# Patient Record
Sex: MALE | Race: WHITE | NOT HISPANIC OR LATINO | URBAN - METROPOLITAN AREA
[De-identification: names, ages, dates, MRNs, and addresses within clinical notes are randomized per-mention and may not be internally consistent; named-entity substitution may affect disease eponyms.]

---

## 2019-02-20 ENCOUNTER — OUTPATIENT (OUTPATIENT)
Dept: OUTPATIENT SERVICES | Facility: HOSPITAL | Age: 64
LOS: 1 days | Discharge: HOME | End: 2019-02-20

## 2019-02-20 VITALS
WEIGHT: 175.05 LBS | HEIGHT: 70 IN | HEART RATE: 76 BPM | RESPIRATION RATE: 17 BRPM | OXYGEN SATURATION: 99 % | SYSTOLIC BLOOD PRESSURE: 140 MMHG | TEMPERATURE: 98 F | DIASTOLIC BLOOD PRESSURE: 87 MMHG

## 2019-02-20 VITALS — DIASTOLIC BLOOD PRESSURE: 78 MMHG | HEART RATE: 79 BPM | SYSTOLIC BLOOD PRESSURE: 136 MMHG | RESPIRATION RATE: 18 BRPM

## 2019-02-20 DIAGNOSIS — Z98.890 OTHER SPECIFIED POSTPROCEDURAL STATES: Chronic | ICD-10-CM

## 2019-02-20 RX ORDER — ONDANSETRON 8 MG/1
4 TABLET, FILM COATED ORAL ONCE
Qty: 0 | Refills: 0 | Status: DISCONTINUED | OUTPATIENT
Start: 2019-02-20 | End: 2019-03-07

## 2019-02-20 RX ORDER — SODIUM CHLORIDE 9 MG/ML
1000 INJECTION, SOLUTION INTRAVENOUS
Qty: 0 | Refills: 0 | Status: DISCONTINUED | OUTPATIENT
Start: 2019-02-20 | End: 2019-03-07

## 2019-02-20 RX ORDER — METOPROLOL TARTRATE 50 MG
1 TABLET ORAL
Qty: 0 | Refills: 0 | COMMUNITY

## 2019-02-20 RX ORDER — ATORVASTATIN CALCIUM 80 MG/1
1 TABLET, FILM COATED ORAL
Qty: 0 | Refills: 0 | COMMUNITY

## 2019-02-20 NOTE — PRE-ANESTHESIA EVALUATION ADULT - NSANTHOSAYNRD_GEN_A_CORE
No. RAMIN screening performed.  STOP BANG Legend: 0-2 = LOW Risk; 3-4 = INTERMEDIATE Risk; 5-8 = HIGH Risk

## 2019-02-28 DIAGNOSIS — H25.12 AGE-RELATED NUCLEAR CATARACT, LEFT EYE: ICD-10-CM

## 2019-02-28 DIAGNOSIS — H43.02 VITREOUS PROLAPSE, LEFT EYE: ICD-10-CM

## 2019-06-28 NOTE — ASU PREOP CHECKLIST - HEIGHT IN INCHES
----- Message from Davina Delgadillo DO sent at 6/28/2019  4:59 PM CDT -----  Please notify patient of normal/negative lab result for group A strep which causes strep throat. There is other strep bacteria present that usually resolves on its own without antibiotics. If not feeling better we can do a trial of antibiotics. Please confirm allergy list and ask patient which pharmacy they prefer.   10

## 2020-06-07 ENCOUNTER — EMERGENCY (EMERGENCY)
Facility: HOSPITAL | Age: 65
LOS: 0 days | Discharge: HOME | End: 2020-06-07
Attending: EMERGENCY MEDICINE | Admitting: EMERGENCY MEDICINE
Payer: COMMERCIAL

## 2020-06-07 VITALS
DIASTOLIC BLOOD PRESSURE: 93 MMHG | HEART RATE: 85 BPM | OXYGEN SATURATION: 98 % | SYSTOLIC BLOOD PRESSURE: 166 MMHG | HEIGHT: 70 IN | RESPIRATION RATE: 17 BRPM | WEIGHT: 169.09 LBS | TEMPERATURE: 98 F

## 2020-06-07 DIAGNOSIS — I10 ESSENTIAL (PRIMARY) HYPERTENSION: ICD-10-CM

## 2020-06-07 DIAGNOSIS — R53.83 OTHER FATIGUE: ICD-10-CM

## 2020-06-07 DIAGNOSIS — Z98.890 OTHER SPECIFIED POSTPROCEDURAL STATES: Chronic | ICD-10-CM

## 2020-06-07 DIAGNOSIS — F41.0 PANIC DISORDER [EPISODIC PAROXYSMAL ANXIETY]: ICD-10-CM

## 2020-06-07 DIAGNOSIS — E78.5 HYPERLIPIDEMIA, UNSPECIFIED: ICD-10-CM

## 2020-06-07 DIAGNOSIS — Z90.89 ACQUIRED ABSENCE OF OTHER ORGANS: ICD-10-CM

## 2020-06-07 DIAGNOSIS — Z98.890 OTHER SPECIFIED POSTPROCEDURAL STATES: ICD-10-CM

## 2020-06-07 PROBLEM — E78.00 PURE HYPERCHOLESTEROLEMIA, UNSPECIFIED: Chronic | Status: ACTIVE | Noted: 2019-02-20

## 2020-06-07 PROCEDURE — 99284 EMERGENCY DEPT VISIT MOD MDM: CPT

## 2020-06-07 PROCEDURE — 93010 ELECTROCARDIOGRAM REPORT: CPT

## 2020-06-07 RX ORDER — HYDROXYZINE HCL 10 MG
1 TABLET ORAL
Qty: 30 | Refills: 0
Start: 2020-06-07 | End: 2020-06-16

## 2020-06-07 RX ORDER — HYDROXYZINE HCL 10 MG
50 TABLET ORAL ONCE
Refills: 0 | Status: COMPLETED | OUTPATIENT
Start: 2020-06-07 | End: 2020-06-07

## 2020-06-07 RX ADMIN — Medication 50 MILLIGRAM(S): at 20:03

## 2020-06-07 NOTE — ED ADULT NURSE NOTE - CHPI ED NUR SYMPTOMS NEG
no chills/no weakness/no dizziness/no fever/no nausea/no tingling/no decreased eating/drinking/no pain/no vomiting

## 2020-06-07 NOTE — ED PROVIDER NOTE - NSFOLLOWUPINSTRUCTIONS_ED_ALL_ED_FT
Anxiety    Generalized anxiety disorder (NAKITA) is a mental disorder. It is defined as anxiety that is not necessarily related to specific events or activities or is out of proportion to said events. Symptoms include restlessness, fatigue, difficulty concentrations, irritability and difficulty concentrating. It interferes with life functions, including relationships, work, and school. If you were started on a medication make sure to take exactly as prescribed and follow up with a psychiatrist.    SEEK IMMEDIATE MEDICAL CARE IF YOU HAVE THE FOLLOWING SYMPTOMS: thoughts about hurting killing yourself, thoughts about hurting or killing somebody else, hallucinations, or worsening depression.

## 2020-06-07 NOTE — ED PROVIDER NOTE - ATTENDING CONTRIBUTION TO CARE
65 y/o male, non smoker with hx of dyslipidemia and HTN presents to ED with difficulty sleeping and tremulous hands intermittently x 3 nights.  Also with nasal congestion, post nasal drip over the past week, hx of same, usually this time of year.  No CP/SOB.  No N/V/abdominal pain.  No numbness, tingling or weakness.  PE:  agree with above.  A/P:  Insomnia/Anxiety.  Labs, EKG, CXR and reassess.

## 2020-06-07 NOTE — ED PROVIDER NOTE - OBJECTIVE STATEMENT
63 yo male hx of HTN/HLD present c/o " I am feeling shaking to my hands and trouble to fall asleep over the past 3 nights." As per patient, he felt tightness to his throat and post nasal drip which may be related to his allergy. Didn't take any allergy medications. denies fever/chill/coughing/HA/dizziness/chest pain/sob/abd pain/n/v/d.

## 2020-06-07 NOTE — ED PROVIDER NOTE - PHYSICAL EXAMINATION
CONSTITUTIONAL: Well-appearing; well-nourished; in no apparent distress.   EYES: PERRL; EOM intact.   ENT: normal nose; no rhinorrhea; normal pharynx with no tonsillar hypertrophy.   NECK: Supple; non-tender; no cervical lymphadenopathy. No JVD.   CARDIOVASCULAR: Normal S1, S2; no murmurs, rubs, or gallops.   RESPIRATORY: Normal chest excursion with respiration; breath sounds clear and equal bilaterally; no wheezes, rhonchi, or rales.  GI/: Normal bowel sounds; non-distended; non-tender; no palpable organomegaly.   SKIN: Normal for age and race; warm; dry; good turgor; no apparent lesions or exudate.   NEURO/PSYCH: A & O x 4; grossly unremarkable. + anxious mood

## 2020-06-07 NOTE — ED PROVIDER NOTE - NS ED ROS FT
Constitutional: no fever, chills, no recent weight loss, change in appetite or malaise  Eyes: no redness/discharge/pain/vision changes  ENT: see HPI  Cardiac: No chest pain, SOB or edema.  Respiratory: No cough or respiratory distress  GI: No nausea, vomiting, diarrhea or abdominal pain.  Neuro: No headache or weakness. No LOC.  Skin: No skin rash.  Endocrine: No history of thyroid disease or diabetes.

## 2020-06-07 NOTE — ED ADULT TRIAGE NOTE - CHIEF COMPLAINT QUOTE
I was in urgent care this morning,, I'm having trouble with allergies, I haven't been able to sleep the last couple of nights and I think that's what is making my blood pressure high - patient

## 2020-06-07 NOTE — ED PROVIDER NOTE - CLINICAL SUMMARY MEDICAL DECISION MAKING FREE TEXT BOX
EKG non ischemic.  No acute findings on imaging and lab work.  Feels better after hydroxyzine.  D/C home to f/u with PMD.  Strict return instructions discussed.

## 2020-06-07 NOTE — ED PROVIDER NOTE - PATIENT PORTAL LINK FT
You can access the FollowMyHealth Patient Portal offered by [facility] by registering at the following website: [email or web address]. By joining [employer]’s FollowMyHealth portal, you will also be able to view your health information using other applications (apps) compatible with our system.

## 2020-06-07 NOTE — ED ADULT NURSE NOTE - OBJECTIVE STATEMENT
pt reports that he may be having a panic attack, states that he hasn't slept for 3 days, pt denies fever, cough, rash, sweats, chills

## 2023-02-01 NOTE — ASU PREOP CHECKLIST - SITE MARKED BY ANESTHESIOLOGIST
Briseida Bhagat (:  1983) is a 45 y.o. male,Established patient, here for evaluation of the following chief complaint(s): Other (Two days he experienced a numbing sensation and heaviness on the tip of tongue for a few days  along with a right side of forehead pain. Right side back pain and pain down left leg with numbness only when sitting on the toilet.)         ASSESSMENT/PLAN:  1. Numbness of tongue  -     CBC with Auto Differential; Future  -     Comprehensive Metabolic Panel; Future  -     Hemoglobin A1C; Future  -     TSH with Reflex to FT4; Future  -     LIPID PANEL; Future    No follow-ups on file. Subjective   SUBJECTIVE/OBJECTIVE:  HPI  Patient presents reports numbness and heaviness  of tongue and a headache around the right eye; he also reports lower back pain as well for the past week. Stated headache resolved but numbness persists. He has not tried anything for relief of this and does not have any past history of this issue   Review of Systems   Constitutional: Negative. HENT: Negative. Eyes: Negative. Respiratory: Negative. Cardiovascular: Negative. Gastrointestinal: Negative. Endocrine: Negative. Genitourinary: Negative. Musculoskeletal:  Positive for back pain. Skin: Negative. Neurological:  Positive for numbness and headaches. Hematological: Negative. Psychiatric/Behavioral: Negative. Objective   Physical Exam  HENT:      Right Ear: Tympanic membrane and ear canal normal.      Left Ear: Tympanic membrane and ear canal normal.      Nose: Nose normal.      Mouth/Throat:      Mouth: Mucous membranes are moist.   Eyes:      Extraocular Movements: Extraocular movements intact. Cardiovascular:      Rate and Rhythm: Normal rate. Pulses: Normal pulses. Heart sounds: Normal heart sounds. Pulmonary:      Effort: Pulmonary effort is normal.      Breath sounds: Normal breath sounds.    Abdominal:      General: Bowel sounds are normal. Palpations: Abdomen is soft. Musculoskeletal:         General: Normal range of motion. Cervical back: Normal range of motion. Skin:     General: Skin is warm. Neurological:      General: No focal deficit present. Mental Status: He is alert and oriented to person, place, and time. Psychiatric:         Mood and Affect: Mood normal.         Behavior: Behavior normal.         Thought Content: Thought content normal.         Judgment: Judgment normal.          On this date 2/1/2023 I have spent 20 minutes reviewing previous notes, test results and face to face with the patient discussing the diagnosis and importance of compliance with the treatment plan as well as documenting on the day of the visit. An electronic signature was used to authenticate this note.     --OLEG Johnson - CNP n/a